# Patient Record
Sex: MALE | Race: AMERICAN INDIAN OR ALASKA NATIVE
[De-identification: names, ages, dates, MRNs, and addresses within clinical notes are randomized per-mention and may not be internally consistent; named-entity substitution may affect disease eponyms.]

---

## 2019-12-06 ENCOUNTER — HOSPITAL ENCOUNTER (EMERGENCY)
Dept: HOSPITAL 5 - ED | Age: 29
LOS: 1 days | Discharge: HOME | End: 2019-12-07
Payer: COMMERCIAL

## 2019-12-06 DIAGNOSIS — J45.909: ICD-10-CM

## 2019-12-06 DIAGNOSIS — R15.9: Primary | ICD-10-CM

## 2019-12-06 DIAGNOSIS — F17.200: ICD-10-CM

## 2019-12-06 PROCEDURE — 99282 EMERGENCY DEPT VISIT SF MDM: CPT

## 2019-12-06 NOTE — EVENT NOTE
ED Screening Note


ED Screening Note: 





states that he is having yellow drainage from the rectum that began two days ago

when he wipes 


denies any lumps/pain around the rectum


states only engages in vaginal intercourse, denies any rectal intercourse  


no blood 





no allergies to meds 





This initial assessment/diagnostic orders/clinical plan/treatment(s) is/are 

subject to change based on patients health status, clinical progression and re-

assessment by fellow clinical providers in the ED. Further treatment and workup 

at subsequent clinical providers discretion. Patient/guardian urged not to elope

from the ED as their condition may be serious if not clinically assessed and 

managed.

## 2019-12-07 VITALS — DIASTOLIC BLOOD PRESSURE: 60 MMHG | SYSTOLIC BLOOD PRESSURE: 108 MMHG

## 2019-12-07 NOTE — EMERGENCY DEPARTMENT REPORT
HPI





- General


Chief Complaint: Nausea/Vomiting/Diarrhea


Time Seen by Provider: 12/06/19 22:13





- HPI


HPI: 





Room 36








The patient is a 29-year-old male presented with a chief complaint of fecal 

incontinence.  Patient states 2 days ago he felt something wet leaking from his 

rectum.  The patient states he wiped and there was a brown liquid present that 

smelled like feces.  Patient did not have any episodes yesterday but today 

patient had a second episode of the same.  Patient denies rectal pain or 

abdominal pain.  Patient denies nausea or vomiting.  Patient denies history of 

fever.  She states his last bowel movement occurred this morning and was within 

normal limits with formed stool.  Patient denies any back pain or injury.  

Patient denies ladder incontinence.  Patient denies lower extremity paresthesia.

 Patient denies unexplained weight loss.  Patient denies rectal trauma





ED Past Medical Hx





- Past Medical History


Previous Medical History?: Yes


Hx Asthma: Yes





- Surgical History


Past Surgical History?: Yes


Additional Surgical History: Right Shoulder





- Family History


Family history: no significant





- Social History


Smoking Status: Current Every Day Smoker


Substance Use Type: None (denies illicit drug use), Alcohol (occasional)





- Medications


Home Medications: 


                                Home Medications











 Medication  Instructions  Recorded  Confirmed  Last Taken  Type


 


Ibuprofen [Motrin] 600 mg PO Q8H PRN #20 tablet 12/10/18  Unknown Rx


 


methOCARBAMOL [Robaxin TAB] 500 mg PO Q6H PRN #15 tablet 12/10/18  Unknown Rx


 


traMADoL [Ultram] 50 mg PO Q6HR PRN #10 tablet 12/10/18  Unknown Rx














ED Review of Systems


ROS: 


Stated complaint: BOWEL LEAKAGE


Other details as noted in HPI





Constitutional: denies: fever


Eyes: denies: eye pain


ENT: denies: throat pain


Respiratory: no symptoms reported


Cardiovascular: denies: chest pain


Endocrine: no symptoms reported


Gastrointestinal: denies: abdominal pain, nausea, vomiting


Genitourinary: denies: dysuria


Musculoskeletal: denies: back pain


Neurological: denies: headache, paresthesias





Physical Exam





- Physical Exam


Vital Signs: 


                                   Vital Signs











  12/06/19





  21:44


 


Temperature 98.2 F


 


Pulse Rate 97 H


 


Respiratory 18





Rate 


 


Blood Pressure 148/90


 


O2 Sat by Pulse 94





Oximetry 











Physical Exam: 





GENERAL: The patient is well-developed well-nourished male lying on stretcher 

not appearing to be in acute distress. []


HEENT: Normocephalic.  Atraumatic.  Extraocular motions are intact.  Patient has

 moist mucous membranes.


NECK: Supple.  Trachea midline


CHEST/LUNGS: Clear to auscultation.  There is no respiratory distress noted.


HEART/CARDIOVASCULAR: Regular.  There is no tachycardia.  There is no gallop rub

 or murmur.


ABDOMEN: Abdomen is soft, nontender.  Patient has normal bowel sounds.  There is

 no abdominal distention.


SKIN: There is no rash.  There is no edema.  There is no diaphoresis.


NEURO: The patient is awake, alert, and oriented.  The patient is cooperative.  

The patient has normal speech


MUSCULOSKELETAL: There is no evidence of acute injury.


RECTAL: No lesions seen.  No drainage visualized.  No perirectal anal fluctuance

 or tenderness





ED Course


                                   Vital Signs











  12/06/19





  21:44


 


Temperature 98.2 F


 


Pulse Rate 97 H


 


Respiratory 18





Rate 


 


Blood Pressure 148/90


 


O2 Sat by Pulse 94





Oximetry 














ED Medical Decision Making





- Differential Diagnosis


passive fecal incontinence


Critical care attestation.: 


If time is entered above; I have spent that time in minutes in the direct care 

of this critically ill patient, excluding procedure time.








ED Disposition


Clinical Impression: 


 Fecal incontinence





Disposition: DC-01 TO HOME OR SELFCARE


Is pt being admited?: No


Does the pt Need Aspirin: No


Condition: Stable


Referrals: 


JULISSA JACKSON MD [Staff Physician] - ASAP (Dr. Jackson is a 

gastroenterologist.  Please follow up with him for further evaluation)


Time of Disposition: 01:44